# Patient Record
Sex: MALE | Race: WHITE | URBAN - METROPOLITAN AREA
[De-identification: names, ages, dates, MRNs, and addresses within clinical notes are randomized per-mention and may not be internally consistent; named-entity substitution may affect disease eponyms.]

---

## 2018-05-07 ENCOUNTER — DOCUMENTATION ONLY (OUTPATIENT)
Dept: OTHER | Facility: CLINIC | Age: 83
End: 2018-05-07

## 2018-05-07 PROBLEM — Z71.89 ACP (ADVANCE CARE PLANNING): Chronic | Status: ACTIVE | Noted: 2018-05-07

## 2025-05-31 ENCOUNTER — APPOINTMENT (OUTPATIENT)
Dept: CT IMAGING | Facility: OTHER | Age: OVER 89
End: 2025-05-31
Attending: PHYSICIAN ASSISTANT
Payer: COMMERCIAL

## 2025-05-31 ENCOUNTER — HOSPITAL ENCOUNTER (EMERGENCY)
Facility: OTHER | Age: OVER 89
Discharge: HOME | End: 2025-05-31
Attending: PHYSICIAN ASSISTANT
Payer: COMMERCIAL

## 2025-05-31 VITALS
BODY MASS INDEX: 24.38 KG/M2 | OXYGEN SATURATION: 97 % | TEMPERATURE: 98.2 F | SYSTOLIC BLOOD PRESSURE: 147 MMHG | RESPIRATION RATE: 18 BRPM | HEART RATE: 72 BPM | WEIGHT: 180 LBS | DIASTOLIC BLOOD PRESSURE: 61 MMHG | HEIGHT: 72 IN

## 2025-05-31 DIAGNOSIS — S00.83XA FACIAL CONTUSION, INITIAL ENCOUNTER: ICD-10-CM

## 2025-05-31 DIAGNOSIS — S01.81XA FACIAL LACERATION, INITIAL ENCOUNTER: ICD-10-CM
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
## 2025-05-31 DIAGNOSIS — S09.90XA INJURY OF HEAD, INITIAL ENCOUNTER: ICD-10-CM

## 2025-05-31 DIAGNOSIS — M54.2 NECK PAIN: ICD-10-CM

## 2025-05-31 PROCEDURE — 250N000009 HC RX 250: Performed by: PHYSICIAN ASSISTANT

## 2025-05-31 PROCEDURE — 72125 CT NECK SPINE W/O DYE: CPT

## 2025-05-31 PROCEDURE — 99284 EMERGENCY DEPT VISIT MOD MDM: CPT | Mod: 25 | Performed by: PHYSICIAN ASSISTANT

## 2025-05-31 PROCEDURE — 70480 CT ORBIT/EAR/FOSSA W/O DYE: CPT | Mod: 26 | Performed by: RADIOLOGY

## 2025-05-31 PROCEDURE — 70480 CT ORBIT/EAR/FOSSA W/O DYE: CPT

## 2025-05-31 PROCEDURE — 12013 RPR F/E/E/N/L/M 2.6-5.0 CM: CPT | Performed by: PHYSICIAN ASSISTANT

## 2025-05-31 PROCEDURE — 72125 CT NECK SPINE W/O DYE: CPT | Mod: 26 | Performed by: RADIOLOGY

## 2025-05-31 PROCEDURE — 70450 CT HEAD/BRAIN W/O DYE: CPT | Mod: 26 | Performed by: RADIOLOGY

## 2025-05-31 PROCEDURE — 70450 CT HEAD/BRAIN W/O DYE: CPT

## 2025-05-31 RX ORDER — GINSENG 100 MG
500 CAPSULE ORAL ONCE
Status: DISCONTINUED | OUTPATIENT
Start: 2025-05-31 | End: 2025-05-31 | Stop reason: HOSPADM

## 2025-05-31 RX ORDER — LIDOCAINE/RACEPINEP/TETRACAINE 4-0.05-0.5
GEL WITH PREFILLED APPLICATOR (ML) TOPICAL ONCE
Status: COMPLETED | OUTPATIENT
Start: 2025-05-31 | End: 2025-05-31

## 2025-05-31 RX ORDER — LIDOCAINE HYDROCHLORIDE AND EPINEPHRINE 10; 10 MG/ML; UG/ML
10 INJECTION, SOLUTION INFILTRATION; PERINEURAL ONCE
Status: DISCONTINUED | OUTPATIENT
Start: 2025-05-31 | End: 2025-05-31 | Stop reason: HOSPADM

## 2025-05-31 RX ADMIN — Medication: at 16:51

## 2025-05-31 ASSESSMENT — ACTIVITIES OF DAILY LIVING (ADL)
ADLS_ACUITY_SCORE: 41
ADLS_ACUITY_SCORE: 41

## 2025-05-31 ASSESSMENT — COLUMBIA-SUICIDE SEVERITY RATING SCALE - C-SSRS
2. HAVE YOU ACTUALLY HAD ANY THOUGHTS OF KILLING YOURSELF IN THE PAST MONTH?: NO
6. HAVE YOU EVER DONE ANYTHING, STARTED TO DO ANYTHING, OR PREPARED TO DO ANYTHING TO END YOUR LIFE?: NO
1. IN THE PAST MONTH, HAVE YOU WISHED YOU WERE DEAD OR WISHED YOU COULD GO TO SLEEP AND NOT WAKE UP?: NO

## 2025-05-31 NOTE — ED PROVIDER NOTES
EMERGENCY DEPARTMENT ENCOUNTER      NAME: Francesco Mcbride  AGE: 89 year old male  YOB: 1929  MRN: [id number]  EVALUATION DATE & TIME: 5/31/2025  4:25 PM    PCP: Rick Maxwell    ED PROVIDER: Primitivo Mar PA-C       CHIEF COMPLAINT:  Chief Complaint   Patient presents with    Fall    Head Injury         HPI  Francesco Mcbride is a pleasant 89 year old male presents to the ER via private vehicle with his son for evaluation of left facial injury.  [place]  Patient was bending over to  a rock to throw a case when he slipped forward landing on his left side of his face.  No loss of consciousness.  No headache.  Sustained abrasions and lacerations around his left forehead and left eye.  Having mild to moderate pain around his left eye.  No visual changes.  No new neck pain.  No other complaints.  Patient not feeling nauseous.  No use of blood thinners.      REVIEW OF SYSTEMS   Review of Systems  As above, otherwise ROS is unremarkable.      PAST MEDICAL HISTORY:  Past Medical History:   Diagnosis Date    [place] (hard of hearing)     IBS (irritable bowel syndrome)          PAST SURGICAL HISTORY:  Past Surgical History:   Procedure Laterality Date    INSERT RADIATION SEEDS PROSTATE N/A 8/10/2016    Procedure: INSERT RADIATION SEEDS PROSTATE;  Surgeon: Orestes Markham MD;  Location: [facility] OR    SURGICAL HISTORY OF -   1999    Repair Rotator Cuff Rt Shoulder    SURGICAL HISTORY OF -   1969    Kidney stone         CURRENT MEDICATIONS:    Current Outpatient Medications   Medication Instructions    Calcium Carb-Cholecalciferol (CALCIUM + D3) 600-200 MG-UNIT TABS Oral    ciprofloxacin (CIPRO) 500 mg, Oral, 2 TIMES DAILY    HYDROcodone-acetaminophen (NORCO) 5-325 MG per tablet 1-2 tablets every 4-6 hours as needed for pain.    Pravastatin Sodium (PRAVACHOL PO) 40 mg, Oral, EVERY EVENING    tamsulosin (FLOMAX) 0.4 MG 24 hr capsule Oral, DAILY          ALLERGIES:  No Known Allergies      FAMILY HISTORY:  History reviewed. No pertinent family history.      SOCIAL HISTORY:   Social History     Socioeconomic History    Marital status:      Spouse name: None    Number of children: None    Years of education: None    Highest education level: None   Tobacco Use    Smoking status: Never   Substance and Sexual Activity    Alcohol use: No    Drug use: No     Social Drivers of Health     Financial Resource Strain: Low Risk  (6/17/2024)    Received from [facility]    Financial Resource Strain     Difficulty of Paying Living Expenses: 3   Food Insecurity: No Food Insecurity (6/17/2024)    Received from [facility]    Food Insecurity     Do you worry your food will run out before you are able to buy more?: 1   Transportation Needs: No Transportation Needs (6/17/2024)    Received from [facility]    Transportation Needs     Does lack of transportation keep you from medical appointments?: 1     Does lack of transportation keep you from work, meetings or getting things that you need?: 1   Social Connections: Socially Integrated (6/17/2024)    Received from [facility]    Social Connections     Do you often feel lonely or isolated from those around you?: 0   Housing Stability: Low Risk  (6/17/2024)    Received from [facility]    Housing Stability     What is your housing situation today?: 1       ==================================================================================================================================    PHYSICAL EXAM    VITAL SIGNS: BP (!) 147/61   Pulse 72   Temp 98.2  F (36.8  C) (Tympanic)   Resp 18   Ht 1.829 m (6')   Wt 81.6 kg (180 lb)   SpO2 97%   BMI 24.41 kg/m      Patient Vitals for the past 24 hrs:   BP Temp Temp src Pulse Resp SpO2 Height Weight   05/31/25  1739 -- -- -- -- -- -- 1.829 m (6') --   05/31/25 1621 (!) 147/61 98.2  F (36.8  C) Tympanic 72 18 97 % -- 81.6 kg (180 lb)       Physical Exam  Constitutional:       Appearance: Normal appearance. He is not ill-appearing or diaphoretic.   HENT:      Head:      Comments: Patient with abrasion to the left forehead.  Patient with a 1 cm laceration to the left eyebrow.  Patient with a 3 cm irregular flap shaped laceration/skin tear to just above the left eyelid.  Patient with a 1 cm laceration to the left cheek over the inferior orbit.  No orbital tenderness.  No step-off.     Right Ear: Tympanic membrane, ear canal and external ear normal.      Left Ear: Tympanic membrane, ear canal and external ear normal.      Nose: Nose normal.      Comments: No nasal bridge tenderness     Mouth/Throat:      Mouth: Mucous membranes are moist.      Pharynx: Oropharynx is clear.   Eyes:      Extraocular Movements: Extraocular movements intact.      Conjunctiva/sclera: Conjunctivae normal.      Pupils: Pupils are equal, round, and reactive to light.      Comments: EOMs were smooth and intact without signs of entrapment.  No globe tenderness.  No visual changes.   Cardiovascular:      Rate and Rhythm: Normal rate and regular rhythm.      Pulses: Normal pulses.      Heart sounds: Normal heart sounds.   Pulmonary:      Effort: Pulmonary effort is normal.      Breath sounds: Normal breath sounds.   Abdominal:      General: Abdomen is flat.      Palpations: Abdomen is soft.      Tenderness: There is no abdominal tenderness.   Musculoskeletal:         General: Normal range of motion.      Cervical back: Normal range of motion and neck supple. No rigidity.      Comments: Lázaro is stable and nontender.  No long bone tenderness or deformity.  No tenderness or signs of injury to the upper or lower extremities.   Skin:     General: Skin is warm.   Neurological:      General: No focal deficit present.      Mental Status: He is alert and oriented  to person, place, and time.      Sensory: No sensory deficit.      Motor: No weakness.   Psychiatric:         Mood and Affect: Mood normal.            ==================================================================================================================================    LABS & RADIOLOGY:  All pertinent labs reviewed and interpreted. Reviewed all pertinent imaging. Please see official radiology report.  Results for orders placed or performed during the hospital encounter of 05/31/25   CT Head w/o Contrast    Impression    IMPRESSION:  HEAD CT:  1.  No acute intracranial process.    FACIAL BONE CT:  1.  No fracture identified.  2.  Left periorbital hematoma.    CERVICAL SPINE CT:  1.  Subtle vertically oriented lucency through the anterior/inferior osteophyte of C4 may represent an age-indeterminate nondisplaced fracture. No associated prevertebral hematoma in this locale. Correlate for symptoms of neck pain.  2.  No evidence for subluxation.   CT Cervical Spine w/o Contrast    Impression    IMPRESSION:  HEAD CT:  1.  No acute intracranial process.    FACIAL BONE CT:  1.  No fracture identified.  2.  Left periorbital hematoma.    CERVICAL SPINE CT:  1.  Subtle vertically oriented lucency through the anterior/inferior osteophyte of C4 may represent an age-indeterminate nondisplaced fracture. No associated prevertebral hematoma in this locale. Correlate for symptoms of neck pain.  2.  No evidence for subluxation.   CT Orbits wo Contrast    Impression    IMPRESSION:  HEAD CT:  1.  No acute intracranial process.    FACIAL BONE CT:  1.  No fracture identified.  2.  Left periorbital hematoma.    CERVICAL SPINE CT:  1.  Subtle vertically oriented lucency through the anterior/inferior osteophyte of C4 may represent an age-indeterminate nondisplaced fracture. No associated prevertebral hematoma in this locale. Correlate for symptoms of neck pain.  2.  No evidence for subluxation.     CT Cervical Spine w/o  Contrast   Final Result   IMPRESSION:   HEAD CT:   1.  No acute intracranial process.      FACIAL BONE CT:   1.  No fracture identified.   2.  Left periorbital hematoma.      CERVICAL SPINE CT:   1.  Subtle vertically oriented lucency through the anterior/inferior osteophyte of C4 may represent an age-indeterminate nondisplaced fracture. No associated prevertebral hematoma in this locale. Correlate for symptoms of neck pain.   2.  No evidence for subluxation.      CT Head w/o Contrast   Final Result   IMPRESSION:   HEAD CT:   1.  No acute intracranial process.      FACIAL BONE CT:   1.  No fracture identified.   2.  Left periorbital hematoma.      CERVICAL SPINE CT:   1.  Subtle vertically oriented lucency through the anterior/inferior osteophyte of C4 may represent an age-indeterminate nondisplaced fracture. No associated prevertebral hematoma in this locale. Correlate for symptoms of neck pain.   2.  No evidence for subluxation.      CT Orbits wo Contrast   Final Result   IMPRESSION:   HEAD CT:   1.  No acute intracranial process.      FACIAL BONE CT:   1.  No fracture identified.   2.  Left periorbital hematoma.      CERVICAL SPINE CT:   1.  Subtle vertically oriented lucency through the anterior/inferior osteophyte of C4 may represent an age-indeterminate nondisplaced fracture. No associated prevertebral hematoma in this locale. Correlate for symptoms of neck pain.   2.  No evidence for subluxation.            PROCEDURES:   INFORMED CONSENT  Discussed the risks, benefits, alternatives, and the necessity of other members of the [facility] team participating in the procedure. All questions answered and informed consent obtained.    UNIVERSAL PROTOCOL  Procedural pause conducted to verify: Correct patient identity, procedure to be performed, and as applicable, correct side and site, correct patient position, and availability of implants, special equipment, or special requirements.    [facility]    -Laceration Repair    Date/Time: 5/31/2025 9:44 PM    Performed by: Primitivo Mar PA-C  Authorized by: Primitivo Mar PA-C    Risks, benefits and alternatives discussed.      ANESTHESIA (see MAR for exact dosages):     Anesthesia method:  Local infiltration    Local anesthetic:  Lidocaine 1% WITH epi (2 cc given with good local anesthesia obtained)  LACERATION DETAILS     Location:  Face    Face location:  L eyebrow    Length (cm):  1    REPAIR TYPE:     Repair type:  Simple    EXPLORATION:     Hemostasis achieved with:  Direct pressure    Wound exploration: entire depth of wound probed and visualized      Wound extent: no foreign body, no signs of injury, no nerve damage, no underlying fracture and no vascular damage      Contaminated: no      TREATMENT:     Amount of cleaning:  Standard    SKIN REPAIR     Repair method:  Sutures    Suture size:  5-0    Suture material:  Fast-absorbing gut    Suture technique:  Simple interrupted    Number of sutures:  4    APPROXIMATION     Approximation:  Close    POST-PROCEDURE DETAILS     Dressing:  Antibiotic ointment      PROCEDURE    Patient Tolerance:  Patient tolerated the procedure well with no immediate complications  [facility]    -Laceration Repair    Date/Time: 5/31/2025 9:45 PM    Performed by: Primitivo Mar PA-C  Authorized by: Primitivo Mar PA-C    Risks, benefits and alternatives discussed.      ANESTHESIA (see MAR for exact dosages):     Anesthesia method:  Topical application    Topical anesthetic:  LET  LACERATION DETAILS     Location:  Face    Face location:  L upper eyelid    Extent:  Superficial    Length (cm):  3    REPAIR TYPE:     Repair type:  Simple    EXPLORATION:     Wound extent: no foreign body, no nerve damage, no underlying fracture and no vascular damage      Contaminated: no      TREATMENT:     Amount of cleaning:  Standard    SKIN REPAIR     Repair method:   Sutures    Suture size:  5-0    Suture material:  Fast-absorbing gut    Suture technique:  Simple interrupted    Number of sutures:  6    APPROXIMATION     Approximation:  Loose    POST-PROCEDURE DETAILS     Dressing:  Antibiotic ointment      PROCEDURE    Patient Tolerance:  Patient tolerated the procedure well with no immediate complications  [facility]    -Laceration Repair    Date/Time: 5/31/2025 9:47 PM    Performed by: Primitivo Mar PA-C  Authorized by: Primitivo Mar PA-C    Risks, benefits and alternatives discussed.      ANESTHESIA (see MAR for exact dosages):     Anesthesia method:  None  LACERATION DETAILS     Location:  Face    Face location:  L cheek    Length (cm):  1    REPAIR TYPE:     Repair type:  Simple    EXPLORATION:     Hemostasis achieved with:  Direct pressure    Wound exploration: entire depth of wound probed and visualized      Wound extent: no foreign body, no nerve damage, no underlying fracture and no vascular damage      Contaminated: no      TREATMENT:     Amount of cleaning:  Standard    SKIN REPAIR     Repair method:  Sutures    Suture size:  5-0    Suture material:  Fast-absorbing gut    Suture technique:  Simple interrupted    Number of sutures:  1    APPROXIMATION     Approximation:  Close    POST-PROCEDURE DETAILS     Dressing:  Antibiotic ointment        ==================================================================================================================================    ED COURSE, MEDICAL DECISION MAKING, FINAL IMPRESSION AND PLAN:     Assessment / Plan:  1. Facial laceration, initial encounter    2. Injury of head, initial encounter    3. Facial contusion, initial encounter    4. Fall from slip, trip, or stumble, initial encounter    5. Neck pain        The patient was interviewed and examined.  HPI and physical exam as below.  Differential diagnosis and MDM Key Documentation Elements as below.  Vitals, triage note,  and nursing notes were reviewed.  BP (!) 147/61   Pulse 72   Temp 98.2  F (36.8  C) (Tympanic)   Resp 18   Ht 1.829 m (6')   Wt 81.6 kg (180 lb)   SpO2 97%   BMI 24.41 kg/m      Differential includes but is not limited to cranial hemorrhage, skull fracture, facial bone fracture, vertebral body fracture.    Patient with abrasion to the left forehead and 3 lacerations around the left eye.  No globe involvement.  No visual changes.  Patient with no significant headache.  Mild to moderate pain around injury sites.  No nausea or vomiting.  No neurological findings.    CT imaging the head was unremarkable for acute fractures.  CT facial bones showed left periorbital hematoma but no signs of facial bone fractures.  CT cervical spine showed age-indeterminate subtle lucency through the anterior/inferior osteophyte of C4 but no signs of displacement.  Patient with no significant tenderness on physical exam, suspect that this is more of age-indeterminate versus new fracture.  Patient with no neurological symptoms to warrant emergent MRI.    Lacerations were repaired here in the ER using eleven 5-0 fast-absorbing sutures were placed.  Bacitracin applied.  Patient was in November, patient was up-to-date.    Would recommend patient following up with primary care for further evaluation when he returns home to the [facility] next week for the possible subtle fracture of an osteophyte of his C4.  No objective physical exam findings to warrant c-collar placement or transfer for emergent MRI.  Patient return to the ER for any new or worsening symptoms.  Wound care discussed.  Signs/symptoms of infection discussed.  Patient discharged home in stable condition.    Pertinent Labs & Imaging studies reviewed. (See chart for details)  Results for orders placed or performed during the hospital encounter of 05/31/25   CT Head w/o Contrast    Impression    IMPRESSION:  HEAD CT:  1.  No acute intracranial process.    FACIAL BONE CT:  1.  No  "fracture identified.  2.  Left periorbital hematoma.    CERVICAL SPINE CT:  1.  Subtle vertically oriented lucency through the anterior/inferior osteophyte of C4 may represent an age-indeterminate nondisplaced fracture. No associated prevertebral hematoma in this locale. Correlate for symptoms of neck pain.  2.  No evidence for subluxation.   CT Cervical Spine w/o Contrast    Impression    IMPRESSION:  HEAD CT:  1.  No acute intracranial process.    FACIAL BONE CT:  1.  No fracture identified.  2.  Left periorbital hematoma.    CERVICAL SPINE CT:  1.  Subtle vertically oriented lucency through the anterior/inferior osteophyte of C4 may represent an age-indeterminate nondisplaced fracture. No associated prevertebral hematoma in this locale. Correlate for symptoms of neck pain.  2.  No evidence for subluxation.   CT Orbits wo Contrast    Impression    IMPRESSION:  HEAD CT:  1.  No acute intracranial process.    FACIAL BONE CT:  1.  No fracture identified.  2.  Left periorbital hematoma.    CERVICAL SPINE CT:  1.  Subtle vertically oriented lucency through the anterior/inferior osteophyte of C4 may represent an age-indeterminate nondisplaced fracture. No associated prevertebral hematoma in this locale. Correlate for symptoms of neck pain.  2.  No evidence for subluxation.     No results found for: \"ABORH\"      Reassessments, Medications, Interventions, & Response to Treatments:  -as above    Medications given during today's ER visit:  Medications   bacitracin ointment 1 g (has no administration in time range)   lidocaine 1% with EPINEPHrine 1:100,000 injection 10 mL (has no administration in time range)   Lido-Racepinephrine-Tetracaine (LET) topical gel GEL ( Topical $Given 5/31/25 7825)       Consultations:  None    Decision Rules, Medical Calculators, Sepsis Criteria, and Risk Stratification Tools:  None    MDM Key Documentation Elements for Patient's Evaluation:  Differential diagnosis to include high risk " considerations: As above  Escalation to admission/observation considered: Admission/observation considered, but patient does not meet admission criteria  Discussions and management with other clinicians:    3a. Independent interpretation of testing performed by another health professional:  -No  3b. Discussion of management or test interpretation with another health professional: -No  Independent interpretation of tests:  Ordering and/or review of 3 test(s)  Discussion of test interpretations with radiology:  No  History obtained from source other than patient or assessment requiring an independent historian:  No  Review of non-ED/external records:  review of 3 records  Diagnostic tests considered but not ultimately performed/deferred:  -MRI cervical spine  Prescription medications considered but not prescribed:  -Antibiotics  Chronic conditions affecting care:  -Advanced age  Care affected by social determinants of health:  -None    The patient's management involved:   - Imaging studies  - Decision regarding minor procedures (laceration repair)    A shared decision making model was used. Time was taken to answer all questions.  Patient and/or associated parties understood and were agreeable to treatment plan.  Plan and all results were discussed. Warning signs and close return precautions to return to the ED given. Copy of results given. Discharged in stable condition. Discharged with discharge instructions outlining plan for further care and follow up.      New prescriptions started at today's ER visit  New Prescriptions    No medications on file       ==================================================================================================================================      IWilliam PA-C, personally performed the services described in this documentation, and it is both accurate and complete.       Primitivo Mar PA-C  05/31/25 8214

## 2025-05-31 NOTE — ED TRIAGE NOTES
[place] hitting the left side of his face. Swelling and bruising present. No LOC. Denies being on thinners. States that he is sore 5/10. Ambulatory steady gait. A/o vss       Triage Assessment (Adult)       Row Name 05/31/25 1622          Triage Assessment    Airway WDL WDL        Respiratory WDL    Respiratory WDL WDL        Skin Circulation/Temperature WDL    Skin Circulation/Temperature WDL WDL        Cardiac WDL    Cardiac WDL WDL        Peripheral/Neurovascular WDL    Peripheral Neurovascular WDL WDL        Cognitive/Neuro/Behavioral WDL    Cognitive/Neuro/Behavioral WDL WDL

## 2025-05-31 NOTE — DISCHARGE INSTRUCTIONS
- Keep wound clean. You may shower normally.   - Gently wash with soap and water, apply antibiotic ointment like petroleum jelly/bacitracin, and then apply new dressing twice a day.    - You may shower normally.  - Return to the ED for any signs of infection to include increasing redness, increasing swelling, increasing pain, discharge, fever, or any other new or worsening symptoms.   - Sutures will fall out on their own about 7 days. You were given 11 sutures today.  If your sutures do not fall out on their own after 7 days, firmly massage sutures until they break apart or place hydrogen peroxide on them.  If you have any issues, you may return to the ER to have sutures removed.  -Recommend following up with your primary care doctor as you may have a small fracture through a bone spur on C4.  This is not displaced.  No involvement of the spinal canal.  Please return to the ER for any new or worsening symptoms.

## (undated) RX ORDER — LIDOCAINE/RACEPINEP/TETRACAINE 4-0.05-0.5
GEL WITH PREFILLED APPLICATOR (ML) TOPICAL
Status: DISPENSED
Start: 2025-05-31

## (undated) RX ORDER — LIDOCAINE HYDROCHLORIDE AND EPINEPHRINE 10; 10 MG/ML; UG/ML
INJECTION, SOLUTION INFILTRATION; PERINEURAL
Status: DISPENSED
Start: 2025-05-31

## (undated) RX ORDER — GINSENG 100 MG
CAPSULE ORAL
Status: DISPENSED
Start: 2025-05-31